# Patient Record
Sex: MALE
[De-identification: names, ages, dates, MRNs, and addresses within clinical notes are randomized per-mention and may not be internally consistent; named-entity substitution may affect disease eponyms.]

---

## 2023-10-30 ENCOUNTER — RX ONLY (RX ONLY)
Age: 58
End: 2023-10-30

## 2023-10-30 ENCOUNTER — APPOINTMENT (OUTPATIENT)
Dept: URBAN - METROPOLITAN AREA CLINIC 278 | Age: 58
Setting detail: DERMATOLOGY
End: 2023-10-30

## 2023-10-30 DIAGNOSIS — L40.8 OTHER PSORIASIS: ICD-10-CM

## 2023-10-30 DIAGNOSIS — L82.1 OTHER SEBORRHEIC KERATOSIS: ICD-10-CM

## 2023-10-30 PROCEDURE — OTHER PRESCRIPTION: OTHER

## 2023-10-30 PROCEDURE — OTHER MEDICATION COUNSELING: OTHER

## 2023-10-30 PROCEDURE — OTHER COUNSELING: OTHER

## 2023-10-30 PROCEDURE — OTHER MIPS QUALITY: OTHER

## 2023-10-30 PROCEDURE — OTHER REASSURANCE: OTHER

## 2023-10-30 PROCEDURE — 99204 OFFICE O/P NEW MOD 45 MIN: CPT

## 2023-10-30 PROCEDURE — OTHER ADDITIONAL NOTES: OTHER

## 2023-10-30 RX ORDER — TAPINAROF 10 MG/1000MG
CREAM TOPICAL
Qty: 120 | Refills: 1

## 2023-10-30 RX ORDER — TAPINAROF 10 MG/1000MG
CREAM TOPICAL
Qty: 120 | Refills: 1 | Status: ERX

## 2023-10-30 RX ORDER — TAPINAROF 10 MG/1000MG
CREAM TOPICAL
Qty: 120 | Refills: 1 | Status: ERX | COMMUNITY
Start: 2023-10-30

## 2023-10-30 ASSESSMENT — LOCATION DETAILED DESCRIPTION DERM
LOCATION DETAILED: RIGHT VENTRAL PROXIMAL FOREARM
LOCATION DETAILED: LEFT AXILLARY VAULT
LOCATION DETAILED: RIGHT DISTAL DORSAL FOREARM
LOCATION DETAILED: LEFT DORSAL FOOT
LOCATION DETAILED: RIGHT AXILLARY VAULT
LOCATION DETAILED: RIGHT ANTERIOR PROXIMAL THIGH
LOCATION DETAILED: LEFT ANTERIOR PROXIMAL THIGH
LOCATION DETAILED: RIGHT ANKLE

## 2023-10-30 ASSESSMENT — LOCATION ZONE DERM
LOCATION ZONE: LEG
LOCATION ZONE: ARM
LOCATION ZONE: FEET
LOCATION ZONE: AXILLAE

## 2023-10-30 ASSESSMENT — LOCATION SIMPLE DESCRIPTION DERM
LOCATION SIMPLE: RIGHT ANKLE
LOCATION SIMPLE: RIGHT AXILLARY VAULT
LOCATION SIMPLE: RIGHT FOREARM
LOCATION SIMPLE: RIGHT THIGH
LOCATION SIMPLE: LEFT FOOT
LOCATION SIMPLE: LEFT THIGH
LOCATION SIMPLE: LEFT AXILLARY VAULT

## 2023-10-30 NOTE — PROCEDURE: ADDITIONAL NOTES
Additional Notes: Apply Vaseline as a barrier
Detail Level: Simple
Render Risk Assessment In Note?: no

## 2023-10-30 NOTE — PROCEDURE: MEDICATION COUNSELING
Diagnosis: CML      Agent/Regimen: Tasigna 300 mg daily     ECO - No physically strenuous activity, but ambulatory and able to carry out light or sedentary work.    Is this the first follow-up call to monitor the start of oral chemo? No    Patient has viewed all assigned BI videos. unknown    Reviewed and verified Advanced Directives: No, patient has no interest in completing Advanced Directives at this time    Nursing Assessment: A focused nursing assessment addressing the toxicity of oral chemotherapy was performed and the patient reports the following:   MA's ROS reviewed. Patient was seen and examined by Dr. Bowers     Patient confirms that he has received a copy of Chemotherapy Consent and verbalizes understanding of treatment plan: Yes.    Treatment Plan: - Patient has valid pre-authorization  - VS completed  - Lab results checked -     Adherence: Discussed oral chemo adherence with patient. Patient taking medication as prescribed.        Next appointment scheduled:   Future Appointments   Date Time Provider Department Center   2021 10:45 AM [facility] ACL LAB [facility]   2021 11:00 AM [facility] INFUSION RN [facility]   8/10/2021  8:00 AM Brad Up MD [facility]   2021 11:15 AM [facility] ACL LAB [facility]   2021 11:30 AM Francisca Yanez PA-C [facility]   11/3/2021 11:00 AM Edilma Connors NP [facility]         Patient instructed to call the office with any questions or concerns.    Pt discharged to patient discharged to home per self, ambulatory          Sotyktu Pregnancy And Lactation Text: There is insufficient data to evaluate whether or not Sotyktu is safe to use during pregnancy.   It is not known if Sotyktu passes into breast milk and whether or not it is safe to use when breastfeeding.
